# Patient Record
Sex: FEMALE | Race: BLACK OR AFRICAN AMERICAN | NOT HISPANIC OR LATINO | Employment: FULL TIME | URBAN - METROPOLITAN AREA
[De-identification: names, ages, dates, MRNs, and addresses within clinical notes are randomized per-mention and may not be internally consistent; named-entity substitution may affect disease eponyms.]

---

## 2018-06-18 ENCOUNTER — OFFICE VISIT (OUTPATIENT)
Dept: URGENT CARE | Facility: CLINIC | Age: 28
End: 2018-06-18
Payer: COMMERCIAL

## 2018-06-18 VITALS
TEMPERATURE: 98 F | HEIGHT: 66 IN | OXYGEN SATURATION: 97 % | HEART RATE: 80 BPM | SYSTOLIC BLOOD PRESSURE: 107 MMHG | RESPIRATION RATE: 20 BRPM | WEIGHT: 125 LBS | DIASTOLIC BLOOD PRESSURE: 72 MMHG | BODY MASS INDEX: 20.09 KG/M2

## 2018-06-18 DIAGNOSIS — J45.21 MILD INTERMITTENT ASTHMATIC BRONCHITIS WITH ACUTE EXACERBATION: Primary | ICD-10-CM

## 2018-06-18 PROCEDURE — 94640 AIRWAY INHALATION TREATMENT: CPT | Mod: 59,S$GLB,, | Performed by: FAMILY MEDICINE

## 2018-06-18 PROCEDURE — 96372 THER/PROPH/DIAG INJ SC/IM: CPT | Mod: S$GLB,,, | Performed by: FAMILY MEDICINE

## 2018-06-18 PROCEDURE — 99203 OFFICE O/P NEW LOW 30 MIN: CPT | Mod: 25,S$GLB,, | Performed by: FAMILY MEDICINE

## 2018-06-18 PROCEDURE — 3008F BODY MASS INDEX DOCD: CPT | Mod: CPTII,S$GLB,, | Performed by: FAMILY MEDICINE

## 2018-06-18 RX ORDER — BETAMETHASONE SODIUM PHOSPHATE AND BETAMETHASONE ACETATE 3; 3 MG/ML; MG/ML
6 INJECTION, SUSPENSION INTRA-ARTICULAR; INTRALESIONAL; INTRAMUSCULAR; SOFT TISSUE ONCE
Status: COMPLETED | OUTPATIENT
Start: 2018-06-18 | End: 2018-06-18

## 2018-06-18 RX ORDER — AZITHROMYCIN 250 MG/1
TABLET, FILM COATED ORAL
Qty: 6 TABLET | Refills: 0 | Status: SHIPPED | OUTPATIENT
Start: 2018-06-18

## 2018-06-18 RX ORDER — ALBUTEROL SULFATE 90 UG/1
2 AEROSOL, METERED RESPIRATORY (INHALATION) EVERY 4 HOURS PRN
Qty: 18 G | Refills: 1 | Status: SHIPPED | OUTPATIENT
Start: 2018-06-18 | End: 2019-06-18

## 2018-06-18 RX ORDER — ALBUTEROL SULFATE 0.83 MG/ML
2.5 SOLUTION RESPIRATORY (INHALATION)
Status: COMPLETED | OUTPATIENT
Start: 2018-06-18 | End: 2018-06-18

## 2018-06-18 RX ADMIN — ALBUTEROL SULFATE 2.5 MG: 0.83 SOLUTION RESPIRATORY (INHALATION) at 04:06

## 2018-06-18 RX ADMIN — BETAMETHASONE SODIUM PHOSPHATE AND BETAMETHASONE ACETATE 6 MG: 3; 3 INJECTION, SUSPENSION INTRA-ARTICULAR; INTRALESIONAL; INTRAMUSCULAR; SOFT TISSUE at 04:06

## 2018-06-18 NOTE — PATIENT INSTRUCTIONS
Bronchitis with Wheezing (Viral or Bacterial: Adult)    Bronchitis is an infection of the air passages. It often occurs during a cold and is usually caused by a virus. Symptoms include cough with mucus (phlegm) and low-grade fever. This illness is contagious during the first few days and is spread through the air by coughing and sneezing, or by direct contact (touching the sick person and then touching your own eyes, nose, or mouth).  If there is a lot of inflammation, air flow is restricted. The air passages may also go into spasm, especially if you have asthma. This causes wheezing and difficulty breathing even in people who do not have asthma.  Bronchitis usually lasts 7 to 14 days. The wheezing should improve with treatment during the first week. An inhaler is often prescribed to relax the air passages and stop wheezing. Antibiotics will be prescribed if your doctor thinks there is also a secondary bacterial infection.  Home care  · If symptoms are severe, rest at home for the first 2 to 3 days. When you go back to your usual activities, don't let yourself get too tired.  · Do not smoke. Also avoid being exposed to secondhand smoke.  · You may use over-the-counter medicine to control fever or pain, unless another medicine was prescribed. Note: If you have chronic liver or kidney disease or have ever had a stomach ulcer or gastrointestinal bleeding, talk with your healthcare provider before using these medicines. Also talk to your provider if you are taking medicine to prevent blood clots.) Aspirin should never be given to anyone younger than 18 years of age who is ill with a viral infection or fever. It may cause severe liver or brain damage.  · Your appetite may be poor, so a light diet is fine. Avoid dehydration by drinking 6 to 8 glasses of fluids per day (such as water, soft drinks, sports drinks, juices, tea, or soup). Extra fluids will help loosen secretions in the nose and lungs.  · Over-the-counter  cough, cold, and sore-throat medicines will not shorten the length of the illness, but they may be helpful to reduce symptoms. (Note: Do not use decongestants if you have high blood pressure.)  · If you were given an inhaler, use it exactly as directed. If you need to use it more often than prescribed, your condition may be worsening. If this happens, contact your healthcare provider.  · If prescribed, finish all antibiotic medicine, even if you are feeling better after only a few days.  Follow-up care  Follow up with your healthcare provider, or as advised. If you had an X-ray or ECG (electrocardiogram), a specialist will review it. You will be notified of any new findings that may affect your care.  Note: If you are age 65 or older, or if you have a chronic lung disease or condition that affects your immune system, or you smoke, talk to your healthcare provider about having a pneumococcal vaccinations and a yearly influenza vaccination (flu shot).  When to seek medical advice  Call your healthcare provider right away if any of these occur:  · Fever of 100.4°F (38°C) or higher  · Coughing up increasing amounts of colored sputum  · Weakness, drowsiness, headache, facial pain, ear pain, or a stiff neck  Call 911, or get immediate medical care  Contact emergency services right away if any of these occur.  · Coughing up blood  · Worsening weakness, drowsiness, headache, or stiff neck  · Increased wheezing not helped with medication, shortness of breath, or pain with breathing  Date Last Reviewed: 9/13/2015  © 1179-3954 [employer]. [address] [place] [address], [place] [address]. All rights reserved. This information is not intended as a substitute for professional medical care. Always follow your healthcare professional's instructions.      YOU NEED TO FOLLOW UP WITH YOUR PCP FOR HELP WITH SMOKING CESSATION AND ADDRESSING YOUR UNDERLYING ALLERGIES.    Make sure that you follow up with your primary care doctor  in the next 2-5 days if needed .  Return to the Urgent Care if signs or symptoms change and certainly if you have worsening symptoms go to the nearest emergency department for further evaluation.

## 2018-06-18 NOTE — PROGRESS NOTES
"Subjective:       Patient ID: Nikolas Le is a 27 y.o. female.    Vitals:  height is 5' 6" (1.676 m) and weight is 56.7 kg (125 lb). Her oral temperature is 98.3 °F (36.8 °C). Her blood pressure is 107/72 and her pulse is 80. Her respiration is 20 and oxygen saturation is 97%.     Chief Complaint: Cough and Sinus Problem    Cough and sinus pressure for 5 days. Hx of asthma and smokes 'occasionally". She notices she wheezes at times. Colored sputum. No F/C.      Cough   This is a new problem. The current episode started in the past 7 days. The problem has been gradually worsening. The problem occurs hourly. The cough is productive of sputum. Associated symptoms include chills, nasal congestion, postnasal drip, shortness of breath and sweats. Pertinent negatives include no chest pain, ear pain, eye redness, fever, headaches, myalgias, sore throat or wheezing. The symptoms are aggravated by lying down. She has tried OTC cough suppressant (Nyquil) for the symptoms. The treatment provided mild relief.   Sinus Problem   This is a new problem. The current episode started in the past 7 days. The problem has been gradually worsening since onset. There has been no fever. The fever has been present for less than 1 day. Her pain is at a severity of 0/10. She is experiencing no pain. Associated symptoms include chills, congestion, coughing and shortness of breath. Pertinent negatives include no ear pain, headaches, hoarse voice or sore throat. Past treatments include acetaminophen. The treatment provided mild relief.     Review of Systems   Constitution: Positive for chills and malaise/fatigue. Negative for fever.   HENT: Positive for congestion and postnasal drip. Negative for ear pain, hoarse voice and sore throat.    Eyes: Negative for discharge and redness.   Cardiovascular: Negative for chest pain, dyspnea on exertion and leg swelling.   Respiratory: Positive for cough, shortness of breath and sputum production. Negative " for wheezing.    Musculoskeletal: Negative for myalgias.   Gastrointestinal: Negative for abdominal pain and nausea.   Neurological: Negative for headaches.       Objective:      Physical Exam   Constitutional: She is oriented to person, place, and time. She appears well-developed and well-nourished. She is cooperative.  Non-toxic appearance. She does not appear ill. No distress.   HENT:   Head: Normocephalic and atraumatic.   Right Ear: Hearing, tympanic membrane, external ear and ear canal normal.   Left Ear: Hearing, tympanic membrane, external ear and ear canal normal.   Nose: Nose normal. No mucosal edema, rhinorrhea or nasal deformity. No epistaxis. Right sinus exhibits no maxillary sinus tenderness and no frontal sinus tenderness. Left sinus exhibits no maxillary sinus tenderness and no frontal sinus tenderness.   Mouth/Throat: Uvula is midline, oropharynx is clear and moist and mucous membranes are normal. No trismus in the jaw. Normal dentition. No uvula swelling. No posterior oropharyngeal erythema.   Eyes: Conjunctivae and lids are normal. No scleral icterus.   Sclera clear bilat   Neck: Trachea normal, full passive range of motion without pain and phonation normal. Neck supple.   Cardiovascular: Normal rate, regular rhythm, normal heart sounds, intact distal pulses and normal pulses.    Pulmonary/Chest: Effort normal. No stridor. No respiratory distress. She has wheezes. She has no rales.   Given albuteral neb treatment.  Repeat exam shows only wheezing with forced exhalation. Excellent air movement.   Abdominal: Soft. Normal appearance and bowel sounds are normal. She exhibits no distension and no mass. There is no tenderness. There is no guarding.   Musculoskeletal: Normal range of motion. She exhibits no edema or deformity.   Neurological: She is alert and oriented to person, place, and time. She exhibits normal muscle tone. Coordination normal.   Skin: Skin is warm, dry and intact. She is not  diaphoretic. No pallor.   Psychiatric: She has a normal mood and affect. Her speech is normal and behavior is normal. Judgment and thought content normal. Cognition and memory are normal.   Nursing note and vitals reviewed.      Assessment:       1. Mild intermittent asthmatic bronchitis with acute exacerbation        Plan:         Mild intermittent asthmatic bronchitis with acute exacerbation  -     albuterol nebulizer solution 2.5 mg; Take 3 mLs (2.5 mg total) by nebulization one time.  -     betamethasone acetate-betamethasone sodium phosphate injection 6 mg; Inject 1 mL (6 mg total) into the muscle once.  -     azithromycin (Z-FATUMA) 250 MG tablet; Take 2 tablets by mouth on day 1; Take 1 tablet by mouth on days 2-5  Dispense: 6 tablet; Refill: 0  -     albuterol 90 mcg/actuation inhaler; Inhale 2 puffs into the lungs every 4 (four) hours as needed for Wheezing. Rescue  Dispense: 18 g; Refill: 1    YOU NEED TO FOLLOW UP WITH YOUR PCP FOR HELP WITH SMOKING CESSATION AND ADDRESSING YOUR UNDERLYING ALLERGIES.    Make sure that you follow up with your primary care doctor in the next 2-5 days if needed .  Return to the Urgent Care if signs or symptoms change and certainly if you have worsening symptoms go to the nearest emergency department for further evaluation.

## 2018-06-21 ENCOUNTER — TELEPHONE (OUTPATIENT)
Dept: URGENT CARE | Facility: CLINIC | Age: 28
End: 2018-06-21

## 2024-07-16 ENCOUNTER — HOSPITAL ENCOUNTER (EMERGENCY)
Facility: OTHER | Age: 34
Discharge: HOME | End: 2024-07-16
Attending: EMERGENCY MEDICINE

## 2024-07-16 VITALS
SYSTOLIC BLOOD PRESSURE: 101 MMHG | DIASTOLIC BLOOD PRESSURE: 57 MMHG | WEIGHT: 125 LBS | RESPIRATION RATE: 18 BRPM | BODY MASS INDEX: 20.09 KG/M2 | HEIGHT: 66 IN | HEART RATE: 78 BPM | OXYGEN SATURATION: 100 % | TEMPERATURE: 98 F

## 2024-07-16 DIAGNOSIS — R06.02 SHORTNESS OF BREATH: ICD-10-CM

## 2024-07-16 DIAGNOSIS — A08.4 VIRAL GASTROENTERITIS: ICD-10-CM

## 2024-07-16 DIAGNOSIS — R42 DIZZINESS: ICD-10-CM

## 2024-07-16 DIAGNOSIS — R11.2 NAUSEA AND VOMITING, UNSPECIFIED VOMITING TYPE: Primary | ICD-10-CM

## 2024-07-16 LAB
ALBUMIN SERPL BCP-MCNC: 4.4 G/DL (ref 3.5–5.2)
ALP SERPL-CCNC: 76 U/L (ref 55–135)
ALT SERPL W/O P-5'-P-CCNC: 28 U/L (ref 10–44)
ANION GAP SERPL CALC-SCNC: 12 MMOL/L (ref 8–16)
AST SERPL-CCNC: 30 U/L (ref 10–40)
B-HCG UR QL: NEGATIVE
BACTERIA #/AREA URNS HPF: ABNORMAL /HPF
BASOPHILS # BLD AUTO: 0.02 K/UL (ref 0–0.2)
BASOPHILS NFR BLD: 0.4 % (ref 0–1.9)
BILIRUB SERPL-MCNC: 1.3 MG/DL (ref 0.1–1)
BILIRUB UR QL STRIP: NEGATIVE
BUN SERPL-MCNC: 8 MG/DL (ref 6–20)
CALCIUM SERPL-MCNC: 10.3 MG/DL (ref 8.7–10.5)
CHLORIDE SERPL-SCNC: 106 MMOL/L (ref 95–110)
CLARITY UR: ABNORMAL
CO2 SERPL-SCNC: 21 MMOL/L (ref 23–29)
COLOR UR: YELLOW
CREAT SERPL-MCNC: 1 MG/DL (ref 0.5–1.4)
CTP QC/QA: YES
DIFFERENTIAL METHOD BLD: ABNORMAL
EOSINOPHIL # BLD AUTO: 0 K/UL (ref 0–0.5)
EOSINOPHIL NFR BLD: 0.4 % (ref 0–8)
ERYTHROCYTE [DISTWIDTH] IN BLOOD BY AUTOMATED COUNT: 13.2 % (ref 11.5–14.5)
EST. GFR  (NO RACE VARIABLE): >60 ML/MIN/1.73 M^2
GLUCOSE SERPL-MCNC: 113 MG/DL (ref 70–110)
GLUCOSE UR QL STRIP: NEGATIVE
HCT VFR BLD AUTO: 42.2 % (ref 37–48.5)
HCV AB SERPL QL IA: NEGATIVE
HGB BLD-MCNC: 14.6 G/DL (ref 12–16)
HGB UR QL STRIP: ABNORMAL
HIV 1+2 AB+HIV1 P24 AG SERPL QL IA: NEGATIVE
HYALINE CASTS #/AREA URNS LPF: 0 /LPF
IMM GRANULOCYTES # BLD AUTO: 0.01 K/UL (ref 0–0.04)
IMM GRANULOCYTES NFR BLD AUTO: 0.2 % (ref 0–0.5)
KETONES UR QL STRIP: ABNORMAL
LEUKOCYTE ESTERASE UR QL STRIP: ABNORMAL
LIPASE SERPL-CCNC: 19 U/L (ref 4–60)
LYMPHOCYTES # BLD AUTO: 0.8 K/UL (ref 1–4.8)
LYMPHOCYTES NFR BLD: 14.4 % (ref 18–48)
MCH RBC QN AUTO: 32.8 PG (ref 27–31)
MCHC RBC AUTO-ENTMCNC: 34.6 G/DL (ref 32–36)
MCV RBC AUTO: 95 FL (ref 82–98)
MICROSCOPIC COMMENT: ABNORMAL
MONOCYTES # BLD AUTO: 0.4 K/UL (ref 0.3–1)
MONOCYTES NFR BLD: 6.5 % (ref 4–15)
NEUTROPHILS # BLD AUTO: 4.2 K/UL (ref 1.8–7.7)
NEUTROPHILS NFR BLD: 78.1 % (ref 38–73)
NITRITE UR QL STRIP: NEGATIVE
NRBC BLD-RTO: 0 /100 WBC
PH UR STRIP: 6 [PH] (ref 5–8)
PLATELET # BLD AUTO: 268 K/UL (ref 150–450)
PMV BLD AUTO: 9.8 FL (ref 9.2–12.9)
POC MOLECULAR INFLUENZA A AGN: NEGATIVE
POC MOLECULAR INFLUENZA B AGN: NEGATIVE
POTASSIUM SERPL-SCNC: 3.8 MMOL/L (ref 3.5–5.1)
PROT SERPL-MCNC: 8.6 G/DL (ref 6–8.4)
PROT UR QL STRIP: ABNORMAL
RBC # BLD AUTO: 4.45 M/UL (ref 4–5.4)
RBC #/AREA URNS HPF: 6 /HPF (ref 0–4)
SARS-COV-2 RDRP RESP QL NAA+PROBE: NEGATIVE
SODIUM SERPL-SCNC: 139 MMOL/L (ref 136–145)
SP GR UR STRIP: >1.03 (ref 1–1.03)
SQUAMOUS #/AREA URNS HPF: 6 /HPF
URN SPEC COLLECT METH UR: ABNORMAL
UROBILINOGEN UR STRIP-ACNC: ABNORMAL EU/DL
WBC # BLD AUTO: 5.36 K/UL (ref 3.9–12.7)
WBC #/AREA URNS HPF: 6 /HPF (ref 0–5)

## 2024-07-16 PROCEDURE — 25000003 PHARM REV CODE 250

## 2024-07-16 PROCEDURE — 80053 COMPREHEN METABOLIC PANEL: CPT | Performed by: NURSE PRACTITIONER

## 2024-07-16 PROCEDURE — 86803 HEPATITIS C AB TEST: CPT | Performed by: EMERGENCY MEDICINE

## 2024-07-16 PROCEDURE — 96361 HYDRATE IV INFUSION ADD-ON: CPT

## 2024-07-16 PROCEDURE — 63600175 PHARM REV CODE 636 W HCPCS

## 2024-07-16 PROCEDURE — 85025 COMPLETE CBC W/AUTO DIFF WBC: CPT | Performed by: NURSE PRACTITIONER

## 2024-07-16 PROCEDURE — 96374 THER/PROPH/DIAG INJ IV PUSH: CPT

## 2024-07-16 PROCEDURE — 99284 EMERGENCY DEPT VISIT MOD MDM: CPT | Mod: 25

## 2024-07-16 PROCEDURE — 81000 URINALYSIS NONAUTO W/SCOPE: CPT | Performed by: NURSE PRACTITIONER

## 2024-07-16 PROCEDURE — 87389 HIV-1 AG W/HIV-1&-2 AB AG IA: CPT | Performed by: EMERGENCY MEDICINE

## 2024-07-16 PROCEDURE — 83690 ASSAY OF LIPASE: CPT | Performed by: NURSE PRACTITIONER

## 2024-07-16 PROCEDURE — 87635 SARS-COV-2 COVID-19 AMP PRB: CPT

## 2024-07-16 PROCEDURE — 81025 URINE PREGNANCY TEST: CPT | Performed by: EMERGENCY MEDICINE

## 2024-07-16 RX ORDER — MECLIZINE HYDROCHLORIDE 25 MG/1
25 TABLET ORAL
Status: COMPLETED | OUTPATIENT
Start: 2024-07-16 | End: 2024-07-16

## 2024-07-16 RX ORDER — ONDANSETRON 4 MG/1
4 TABLET, ORALLY DISINTEGRATING ORAL EVERY 8 HOURS PRN
Qty: 30 TABLET | Refills: 0 | Status: SHIPPED | OUTPATIENT
Start: 2024-07-16

## 2024-07-16 RX ORDER — MECLIZINE HYDROCHLORIDE 25 MG/1
25 TABLET ORAL 3 TIMES DAILY PRN
Qty: 15 TABLET | Refills: 0 | Status: SHIPPED | OUTPATIENT
Start: 2024-07-16 | End: 2024-07-21

## 2024-07-16 RX ORDER — ONDANSETRON HYDROCHLORIDE 2 MG/ML
4 INJECTION, SOLUTION INTRAVENOUS
Status: COMPLETED | OUTPATIENT
Start: 2024-07-16 | End: 2024-07-16

## 2024-07-16 RX ORDER — ONDANSETRON 4 MG/1
4 TABLET, ORALLY DISINTEGRATING ORAL
Status: DISCONTINUED | OUTPATIENT
Start: 2024-07-16 | End: 2024-07-16

## 2024-07-16 RX ADMIN — MECLIZINE HYDROCHLORIDE 25 MG: 25 TABLET ORAL at 02:07

## 2024-07-16 RX ADMIN — ONDANSETRON 4 MG: 2 INJECTION INTRAMUSCULAR; INTRAVENOUS at 02:07

## 2024-07-16 RX ADMIN — SODIUM CHLORIDE 1000 ML: 9 INJECTION, SOLUTION INTRAVENOUS at 02:07

## 2024-07-16 NOTE — FIRST PROVIDER EVALUATION
"Medical screening examination initiated.  I have conducted a focused provider triage encounter, findings are as follows:    Brief history of present illness:  dizziness, N/V, arms are tingling    Vitals:    07/16/24 1201   BP: 122/74   Pulse: 109   Resp: (!) 22   Temp: 98 °F (36.7 °C)   TempSrc: Oral   SpO2: 100%   Weight: 56.7 kg (125 lb)   Height: 5' 6" (1.676 m)       Pertinent physical exam:  appears in mild distress secondary to not feeling well, ill appearing    Brief workup plan:  labs    Preliminary workup initiated; this workup will be continued and followed by the physician or advanced practice provider that is assigned to the patient when roomed.  "

## 2024-07-16 NOTE — Clinical Note
"Nikolas Cruze" Rey was seen and treated in our emergency department on 7/16/2024.  She may return to work on 07/18/2024.       If you have any questions or concerns, please don't hesitate to call.      Jen Calvillo PA-C"

## 2024-07-16 NOTE — ED PROVIDER NOTES
Encounter Date: 7/16/2024       History     Chief Complaint   Patient presents with    Shortness of Breath     SOB, n/v, dizziness, numbness in hands onset last evening; speaking in complete sentences. Denies CP. Hx asthma; denies hx anxiety. Tearful in triage.     This is a 33-year-old female who presents to the ED with complaints of nausea and vomiting since this morning. Patient states that she has not been able to keep down any food or fluids. She also complaints of dyspnea on exertion. She has a history of asthma, but has not had an exacerbation in many years.  Patient also complaining of dizziness that is worse with walking around that started with all these symptoms.  Reports that her symptoms all improve while she is sitting down.  Denies fever, chills, cough, chest pain, abdominal pain, urinary symptoms.    The history is provided by the patient.     Review of patient's allergies indicates:  No Known Allergies  No past medical history on file.  No past surgical history on file.  Family History   Problem Relation Name Age of Onset    No Known Problems Mother      No Known Problems Father       Social History     Tobacco Use    Smoking status: Never    Smokeless tobacco: Never   Substance Use Topics    Alcohol use: Yes    Drug use: No     Review of Systems  10 point ROS performed and negative except as stated in HPI   Physical Exam     Initial Vitals [07/16/24 1201]   BP Pulse Resp Temp SpO2   122/74 109 (!) 22 98 °F (36.7 °C) 100 %      MAP       --         Physical Exam    Constitutional: She appears well-developed and well-nourished.  Non-toxic appearance. She does not appear ill. No distress.   HENT:   Head: Normocephalic and atraumatic.   Eyes: Conjunctivae are normal.   Neck: Neck supple.   Cardiovascular:  Normal rate and regular rhythm.           Pulmonary/Chest: Effort normal. No tachypnea. No respiratory distress. She has no decreased breath sounds. She has no wheezes. She has no rhonchi.    Abdominal: Abdomen is soft and flat. There is no abdominal tenderness.   Musculoskeletal:      Cervical back: Neck supple.     Neurological: She is alert and oriented to person, place, and time.   Skin: Skin is warm, dry and intact.   Psychiatric: She has a normal mood and affect. Her speech is normal and behavior is normal.         ED Course   Procedures  Labs Reviewed   CBC W/ AUTO DIFFERENTIAL - Abnormal; Notable for the following components:       Result Value    MCH 32.8 (*)     Lymph # 0.8 (*)     Gran % 78.1 (*)     Lymph % 14.4 (*)     All other components within normal limits   COMPREHENSIVE METABOLIC PANEL - Abnormal; Notable for the following components:    CO2 21 (*)     Glucose 113 (*)     Total Protein 8.6 (*)     Total Bilirubin 1.3 (*)     All other components within normal limits   URINALYSIS, REFLEX TO URINE CULTURE - Abnormal; Notable for the following components:    Appearance, UA Hazy (*)     Specific Gravity, UA >1.030 (*)     Protein, UA 3+ (*)     Ketones, UA 3+ (*)     Occult Blood UA 1+ (*)     Urobilinogen, UA 2.0-3.0 (*)     Leukocytes, UA Trace (*)     All other components within normal limits    Narrative:     Specimen Source->Urine   URINALYSIS MICROSCOPIC - Abnormal; Notable for the following components:    RBC, UA 6 (*)     WBC, UA 6 (*)     Bacteria Few (*)     All other components within normal limits    Narrative:     Specimen Source->Urine   HIV 1 / 2 ANTIBODY    Narrative:     Release to patient->Immediate   HEPATITIS C ANTIBODY    Narrative:     Release to patient->Immediate   LIPASE   POCT URINE PREGNANCY   SARS-COV-2 RDRP GENE   POCT INFLUENZA A/B MOLECULAR          Imaging Results              X-Ray Chest PA And Lateral (Final result)  Result time 07/16/24 14:17:22      Final result by Brian Mcdonald III, MD (07/16/24 14:17:22)                   Impression:      No acute process seen.      Electronically signed by: Brian Mcdonald MD  Date:    07/16/2024  Time:    14:17                Narrative:    EXAMINATION:  XR CHEST PA AND LATERAL    CLINICAL HISTORY:  Shortness of breath    FINDINGS:  Chest two views:    Heart size is normal.  Lungs are clear.  The bones are noncontributory.                                       Medications   sodium chloride 0.9% bolus 1,000 mL 1,000 mL (0 mLs Intravenous Stopped 7/16/24 1645)   ondansetron injection 4 mg (4 mg Intravenous Given 7/16/24 1400)   meclizine tablet 25 mg (25 mg Oral Given 7/16/24 1449)     Medical Decision Making  Urgent evaluation of an afebrile 33-year-old female with multiple complaints.  Patient appears well nontoxic, with stable vital signs.  Lungs clear to auscultation bilaterally without evidence of respiratory distress.  She is without shortness of breath while at rest.  Known community transmission of COVID, flu, and viral gastroenteritis at this time which is at the top of my differential.  Will swab for COVID and flu, obtain basic labs, treat with antiemetics and fluids, and reassess.    Differential diagnosis includes been on 2:   COVID, flu, viral gastroenteritis, UTI, asthma exacerbation, viral URI     Amount and/or Complexity of Data Reviewed  Labs: ordered. Decision-making details documented in ED Course.  Radiology: ordered.    Risk  Prescription drug management.               ED Course as of 07/16/24 1719   Tue Jul 16, 2024   1334 CBC without leukocytosis or anemia [TORREY]   1340 UA with evidence of dehydration, no UTI [TORREY]   1346 Lipase: 19 [TORREY]   1346 BILIRUBIN TOTAL(!): 1.3 [TORREY]   1346 CMP without significant electrolyte abnormality or renal insufficiency.  Mildly elevated bilirubin of 1.3, otherwise normal LFTs [TORREY]   1432 CXR no acute process [TORREY]   1439 SARS-CoV-2 RNA, Amplification, Qual: Negative [TORREY]   1439 POC Molecular Influenza A Ag: Negative [TORREY]   1439 POC Molecular Influenza B Ag: Negative [TORREY]   1448 Upon re-evaluation, patient reports improvement in symptoms.  Still reports some dizziness when she  turns her head quickly.  IV fluids continuing. No further nausea or vomiting. [TORREY]   1528 Patient with significant improvement after IV fluids, Zofran, and meclizine. [TORREY]   1600 Patient continues to feel much better after treatment here and IV hydration.  Suspect gastroenteritis and will discharge with continued supportive care.  Counseled on need to follow up with PCP for further evaluation if symptoms persist.  Patient educated on signs and symptoms to monitor for and when to return to ED. Patient verbalized understanding agrees with treatment plan. All questions and concerns addressed. [TORREY]      ED Course User Index  [TORREY] Jen Calvillo PA-C                           Clinical Impression:  Final diagnoses:  [R06.02] Shortness of breath  [A08.4] Viral gastroenteritis  [R42] Dizziness  [R11.2] Nausea and vomiting, unspecified vomiting type (Primary)          ED Disposition Condition    Discharge Stable          ED Prescriptions       Medication Sig Dispense Start Date End Date Auth. Provider    ondansetron (ZOFRAN-ODT) 4 MG TbDL Take 1 tablet (4 mg total) by mouth every 8 (eight) hours as needed (for nausea). 30 tablet 7/16/2024 -- Jen Calvillo PA-C    meclizine (ANTIVERT) 25 mg tablet Take 1 tablet (25 mg total) by mouth 3 (three) times daily as needed for Dizziness. 15 tablet 7/16/2024 7/21/2024 Jen Calvillo PA-C          Follow-up Information       Follow up With Specialties Details Why Contact Info    Marnie Tellez MD Family Medicine Schedule an appointment as soon as possible for a visit  for re-check in 3-5 days if not improving [address]  [facility] [address]  [phone]      [facility] Emergency Dept Emergency Medicine Go to  If symptoms worsen [address] [address] [phone]             Jen Calvillo PA-C  07/16/24 4948

## 2024-07-16 NOTE — ED TRIAGE NOTES
Pt presents to ED today c/o N/V and reports SOB due to vomiting  Pt speaking in full sentences without difficulty   Pt denies taking medication for sx